# Patient Record
Sex: MALE | Race: WHITE | NOT HISPANIC OR LATINO
[De-identification: names, ages, dates, MRNs, and addresses within clinical notes are randomized per-mention and may not be internally consistent; named-entity substitution may affect disease eponyms.]

---

## 2020-06-23 ENCOUNTER — TRANSCRIPTION ENCOUNTER (OUTPATIENT)
Age: 60
End: 2020-06-23

## 2021-07-15 ENCOUNTER — APPOINTMENT (OUTPATIENT)
Dept: SURGERY | Facility: CLINIC | Age: 61
End: 2021-07-15
Payer: COMMERCIAL

## 2021-07-15 VITALS
BODY MASS INDEX: 22.9 KG/M2 | HEART RATE: 73 BPM | DIASTOLIC BLOOD PRESSURE: 79 MMHG | WEIGHT: 160 LBS | HEIGHT: 70 IN | SYSTOLIC BLOOD PRESSURE: 118 MMHG

## 2021-07-15 DIAGNOSIS — Z78.9 OTHER SPECIFIED HEALTH STATUS: ICD-10-CM

## 2021-07-15 DIAGNOSIS — L72.9 FOLLICULAR CYST OF THE SKIN AND SUBCUTANEOUS TISSUE, UNSPECIFIED: ICD-10-CM

## 2021-07-15 PROCEDURE — 99203 OFFICE O/P NEW LOW 30 MIN: CPT

## 2021-07-15 RX ORDER — PRAVASTATIN SODIUM 40 MG/1
40 TABLET ORAL
Refills: 0 | Status: ACTIVE | COMMUNITY

## 2021-07-18 NOTE — CONSULT LETTER
[Dear  ___] : Dear  [unfilled], [Consult Letter:] : I had the pleasure of evaluating your patient, [unfilled]. [Please see my note below.] : Please see my note below. [Consult Closing:] : Thank you very much for allowing me to participate in the care of this patient.  If you have any questions, please do not hesitate to contact me. [Sincerely,] : Sincerely, [FreeTextEntry2] : Dr. Steven Goldberg [FreeTextEntry3] : Adria Escalona MD, FACS\par System Director, Endocrine Surgery\par [facility]\par Assistant Clinical Professor of Surgery\par [facility] [employer] [facility]\[place]

## 2021-07-18 NOTE — HISTORY OF PRESENT ILLNESS
[de-identified] : Pt c/o recurrent neck mass . prior lipoma excision  2005.   denies pain or infection, drainage or history of trauma. \par I have reviewed all old and new data and available images.\par

## 2021-07-18 NOTE — ASSESSMENT
[FreeTextEntry1] : probable lipoma. requested sonogram to further assess.  to call next week for results. discussed may want to consider observation since excision will risk marginal mandibular nerve.  patient has been given the opportunity to ask questions, and all of the patient's questions have been answered to their satisfaction

## 2021-07-18 NOTE — PHYSICAL EXAM
[de-identified] : well healed scar right mid neck.  3.5 cm mass, multilobulated along right anterior edge of mandible, remote from prior surgical site [de-identified] : no palpable thyroid nodules [Laryngoscopy Performed] : laryngoscopy was performed, see procedure section for findings [Midline] : located in midline position [Normal] : orientation to person, place, and time: normal

## 2021-07-19 ENCOUNTER — APPOINTMENT (OUTPATIENT)
Dept: ULTRASOUND IMAGING | Facility: CLINIC | Age: 61
End: 2021-07-19
Payer: COMMERCIAL

## 2021-07-19 ENCOUNTER — OUTPATIENT (OUTPATIENT)
Dept: OUTPATIENT SERVICES | Facility: HOSPITAL | Age: 61
LOS: 1 days | End: 2021-07-19
Payer: COMMERCIAL

## 2021-07-19 DIAGNOSIS — D17.0 BENIGN LIPOMATOUS NEOPLASM OF SKIN AND SUBCUTANEOUS TISSUE OF HEAD, FACE AND NECK: ICD-10-CM

## 2021-07-19 DIAGNOSIS — Z98.89 OTHER SPECIFIED POSTPROCEDURAL STATES: Chronic | ICD-10-CM

## 2021-07-19 DIAGNOSIS — L72.0 EPIDERMAL CYST: Chronic | ICD-10-CM

## 2021-07-19 PROCEDURE — 76536 US EXAM OF HEAD AND NECK: CPT | Mod: 26

## 2021-07-19 PROCEDURE — 76536 US EXAM OF HEAD AND NECK: CPT

## 2021-07-26 ENCOUNTER — NON-APPOINTMENT (OUTPATIENT)
Age: 61
End: 2021-07-26

## 2022-01-04 ENCOUNTER — APPOINTMENT (OUTPATIENT)
Dept: SURGERY | Facility: CLINIC | Age: 62
End: 2022-01-04
Payer: COMMERCIAL

## 2022-01-04 PROCEDURE — 99213 OFFICE O/P EST LOW 20 MIN: CPT

## 2022-01-04 NOTE — PHYSICAL EXAM
[de-identified] : well healed scar right mid neck.  2.5 cm mass, multilobulated along right anterior edge of mandible, remote from prior surgical site [de-identified] : no palpable thyroid nodules [Midline] : located in midline position [Normal] : orientation to person, place, and time: normal

## 2022-01-04 NOTE — HISTORY OF PRESENT ILLNESS
[de-identified] : Pt c/o recurrent neck mass . prior lipoma excision  2005.   denies pain or infection, drainage or history of trauma. sonogram last visit showed 1.8 cm lipoma. no changes medically since last visit. I have reviewed all old and new data and available images.\par

## 2022-01-04 NOTE — ASSESSMENT
[FreeTextEntry1] : options for management discussed including risk of recurrence following excision and risk of nerve injury.. discussed may want to consider observation since excision will risk marginal mandibular nerve.  patient has been given the opportunity to ask questions, and all of the patient's questions have been answered to their satisfaction.  wishes to keep under observation.  to return earlier if any change. .

## 2023-01-03 ENCOUNTER — APPOINTMENT (OUTPATIENT)
Dept: SURGERY | Facility: CLINIC | Age: 63
End: 2023-01-03
Payer: COMMERCIAL

## 2023-01-03 DIAGNOSIS — D17.0 BENIGN LIPOMATOUS NEOPLASM OF SKIN AND SUBCUTANEOUS TISSUE OF HEAD, FACE AND NECK: ICD-10-CM

## 2023-01-03 PROCEDURE — 99213 OFFICE O/P EST LOW 20 MIN: CPT

## 2023-01-03 RX ORDER — PRAVASTATIN SODIUM 20 MG/1
20 TABLET ORAL
Qty: 90 | Refills: 0 | Status: ACTIVE | COMMUNITY
Start: 2022-11-21

## 2023-01-03 NOTE — HISTORY OF PRESENT ILLNESS
[de-identified] : Pt c/o recurrent neck mass . prior lipoma excision  2005.   denies pain or infection, drainage or history of trauma. sonogram previously showed 1.8 cm lipoma. no changes medically since last visit. I have reviewed all old and new data and available images.\par

## 2023-01-03 NOTE — PHYSICAL EXAM
[de-identified] : well healed scar right mid neck.  2.5 cm mass, multilobulated along right anterior edge of mandible, remote from prior surgical site [de-identified] : no palpable thyroid nodules [Midline] : located in midline position [Normal] : orientation to person, place, and time: normal

## 2023-03-22 ENCOUNTER — APPOINTMENT (OUTPATIENT)
Dept: ORTHOPEDIC SURGERY | Facility: CLINIC | Age: 63
End: 2023-03-22
Payer: COMMERCIAL

## 2023-03-22 ENCOUNTER — NON-APPOINTMENT (OUTPATIENT)
Age: 63
End: 2023-03-22

## 2023-03-22 VITALS
HEIGHT: 70 IN | BODY MASS INDEX: 22.9 KG/M2 | DIASTOLIC BLOOD PRESSURE: 74 MMHG | HEART RATE: 68 BPM | SYSTOLIC BLOOD PRESSURE: 132 MMHG | WEIGHT: 160 LBS

## 2023-03-22 DIAGNOSIS — M72.0 PALMAR FASCIAL FIBROMATOSIS [DUPUYTREN]: ICD-10-CM

## 2023-03-22 PROCEDURE — 99203 OFFICE O/P NEW LOW 30 MIN: CPT

## 2023-03-22 NOTE — HISTORY OF PRESENT ILLNESS
[Right] : right hand dominant [FreeTextEntry1] : He comes in today for evaluation of bilateral hand nodules, which he noticed about 1 month ago. He denies injury. He denies pain as well as numbness and tingling. He has not had prior treatment.

## 2023-03-22 NOTE — DISCUSSION/SUMMARY
[FreeTextEntry1] : He has findings consistent with a left middle finger Dupuytren's nodule along the middle finger ray as well as early Dupuytren's disease at the right finger along the middle finger ray.  He does not have a contracture and denies pain.\par \par I had a discussion with the patient regarding today's visit, the prognosis of this diagnosis, and treatment recommendations and options.\par \par At this time, we discussed the nature and etiology of Dupuytren's disease in great detail. Given he is asymptomatic and does not have a flexion contracture, I recommended observation. He will return to the office for further treatment if his symptoms persist of worsen.  We discussed the potential of a contracture.  In addition, if the nodule increases in size or he develops pain, then he will return to the office to discuss treatment recommendations.\par \par He has agreed to the above plan of management and has expressed full understanding.  All questions were fully answered to their satisfaction. \par \par My cumulative time spent on this visit included: Preparation for the visit, review of the medical records, review of pertinent diagnostic studies, examination and counseling of the patient on the above diagnosis, treatment plan and prognosis, orders of diagnostic tests, medication and/or appropriate procedures and documentation in the medical records of today's visit.

## 2023-03-22 NOTE — END OF VISIT
[FreeTextEntry3] : This note was written by Bonny Yin on 03/22/2023 acting solely as a scribe for Dr. Mychal Tripp.\par  \par All medical record entries made by the Scribe were at my, Dr. Mychal Tripp, direction and personally dictated by me on 03/22/2023. I have personally reviewed the chart and agree that the record accurately reflects my personal performance of the history, physical exam, assessment and plan.

## 2023-03-22 NOTE — PHYSICAL EXAM
[de-identified] : - Constitutional: This is a male in no obvious distress.  \par - Psych: Patient is alert and oriented to person, place and time.  Patient has a normal mood and affect.\par - Cardiovascular: Normal pulses throughout the upper extremities.  No significant varicosities are noted in the upper extremities. \par - Neuro: Strength and sensation are intact throughout the upper extremities.  Patient has normal coordination.\par - Respiratory:  Patient exhibits no evidence of shortness of breath or difficulty breathing.\par - Skin: No rashes, lesions, or other abnormalities are noted in the upper extremities.\par \par --- \par \par Examination was left hand demonstrates a Dupuytren's nodule just proximal to the middle finger A1 pulley.  There is no tenderness.  There is no contracture.  He has full flexion and extension.  He is neurovascularly  intact distally.\par \par Examination of his right hand demonstrates thickening of the palmar fascia along the middle finger ray also consistent with Dupuytren's disease.  There is no contracture.  He has full flexion and extension.  He is neurovascularly intact distally.

## 2023-03-22 NOTE — ADDENDUM
[FreeTextEntry1] : I, Bonny Yin, acted solely as a scribe for Dr. Tripp on this date on 03/22/2023.

## 2023-03-30 ENCOUNTER — APPOINTMENT (OUTPATIENT)
Dept: ORTHOPEDIC SURGERY | Facility: CLINIC | Age: 63
End: 2023-03-30

## 2024-05-28 ENCOUNTER — APPOINTMENT (OUTPATIENT)
Dept: SURGERY | Facility: CLINIC | Age: 64
End: 2024-05-28
Payer: COMMERCIAL

## 2024-05-28 PROCEDURE — 99213 OFFICE O/P EST LOW 20 MIN: CPT

## 2024-05-28 NOTE — HISTORY OF PRESENT ILLNESS
[de-identified] : Pt c/o recurrent neck mass . prior lipoma excision  2005.   denies pain or infection, drainage or history of trauma. sonogram previously showed 1.8 cm lipoma. no changes medically since last visit. I have reviewed all old and new data and available images.\par

## 2024-05-28 NOTE — PHYSICAL EXAM
[de-identified] : well healed scar right mid neck.  2.5 cm mass, multilobulated along right anterior edge of mandible, remote from prior surgical site [de-identified] : no palpable thyroid nodules [Midline] : located in midline position [Normal] : orientation to person, place, and time: normal

## 2024-09-17 ENCOUNTER — APPOINTMENT (OUTPATIENT)
Dept: OTOLARYNGOLOGY | Facility: CLINIC | Age: 64
End: 2024-09-17
Payer: COMMERCIAL

## 2024-09-17 VITALS
WEIGHT: 160 LBS | OXYGEN SATURATION: 95 % | TEMPERATURE: 97.7 F | HEART RATE: 82 BPM | SYSTOLIC BLOOD PRESSURE: 129 MMHG | HEIGHT: 70 IN | BODY MASS INDEX: 22.9 KG/M2 | DIASTOLIC BLOOD PRESSURE: 79 MMHG

## 2024-09-17 PROCEDURE — 99203 OFFICE O/P NEW LOW 30 MIN: CPT

## 2024-09-17 NOTE — HISTORY OF PRESENT ILLNESS
[de-identified] : [birth year] 63M presenting with neck mass. He has a history of recurrent lipomas, last removal in 2005. Currently he has a lipoma at the angle of the R mandible. It is not painful. No weakness in the R face. No issues with eating or drinking. He wanted a second opinion since Dr. Escalona did not feel comfortable performing in the office because of the proximity to marginal nerve. Otherwise he is in good health and has no other complaints.

## 2024-09-17 NOTE — PHYSICAL EXAM
[Midline] : trachea located in midline position [de-identified] : + lipomatous lesion multilobulated, soft, proximal to angle of the right mandible  [Normal] : no rashes [FreeTextEntry2] : Right facial lipoma 3cm

## 2024-09-17 NOTE — REVIEW OF SYSTEMS
[As Noted in HPI] : as noted in HPI [Swelling Neck] : swelling neck [Negative] : Heme/Lymph [FreeTextEntry4] : Right side, history of lipoma

## 2024-09-17 NOTE — PHYSICAL EXAM
[Midline] : trachea located in midline position [de-identified] : + lipomatous lesion multilobulated, soft, proximal to angle of the right mandible  [Normal] : no rashes [FreeTextEntry2] : Right facial lipoma 3cm

## 2024-09-17 NOTE — HISTORY OF PRESENT ILLNESS
[de-identified] : [birth year] 63M presenting with neck mass. He has a history of recurrent lipomas, last removal in 2005. Currently he has a lipoma at the angle of the R mandible. It is not painful. No weakness in the R face. No issues with eating or drinking. He wanted a second opinion since Dr. Escalona did not feel comfortable performing in the office because of the proximity to marginal nerve. Otherwise he is in good health and has no other complaints.

## 2025-05-27 ENCOUNTER — APPOINTMENT (OUTPATIENT)
Dept: SURGERY | Facility: CLINIC | Age: 65
End: 2025-05-27